# Patient Record
Sex: FEMALE | Race: WHITE | NOT HISPANIC OR LATINO | Employment: OTHER | URBAN - METROPOLITAN AREA
[De-identification: names, ages, dates, MRNs, and addresses within clinical notes are randomized per-mention and may not be internally consistent; named-entity substitution may affect disease eponyms.]

---

## 2017-06-15 PROBLEM — C82.93 NODULAR LYMPHOMA OF INTRA-ABDOMINAL LYMPH NODES: Status: ACTIVE | Noted: 2017-06-15

## 2017-08-28 ENCOUNTER — HOSPITAL ENCOUNTER (OUTPATIENT)
Facility: HOSPITAL | Age: 77
Discharge: HOME | End: 2017-08-29
Attending: EMERGENCY MEDICINE | Admitting: INTERNAL MEDICINE
Payer: COMMERCIAL

## 2017-08-28 DIAGNOSIS — R56.9 SEIZURE: ICD-10-CM

## 2017-08-28 DIAGNOSIS — R55 SYNCOPE, UNSPECIFIED SYNCOPE TYPE: Primary | ICD-10-CM

## 2017-08-28 DIAGNOSIS — R55 SYNCOPE: ICD-10-CM

## 2017-08-28 PROBLEM — M54.30 SCIATIC PAIN: Status: ACTIVE | Noted: 2017-08-28

## 2017-08-28 PROBLEM — E78.5 HYPERLIPIDEMIA: Status: ACTIVE | Noted: 2017-08-28

## 2017-08-28 PROBLEM — E87.6 HYPOKALEMIA: Status: ACTIVE | Noted: 2017-08-28

## 2017-08-28 PROBLEM — R40.4 TRANSIENT ALTERATION OF AWARENESS: Status: ACTIVE | Noted: 2017-08-28

## 2017-08-28 PROBLEM — Z85.528 H/O RENAL CELL CARCINOMA: Status: ACTIVE | Noted: 2017-08-28

## 2017-08-28 PROBLEM — I10 ESSENTIAL HYPERTENSION: Status: ACTIVE | Noted: 2017-08-28

## 2017-08-28 PROBLEM — N18.30 STAGE 3 CHRONIC KIDNEY DISEASE: Status: ACTIVE | Noted: 2017-08-28

## 2017-08-28 LAB
ALBUMIN SERPL BCP-MCNC: 3.2 G/DL
ALP SERPL-CCNC: 70 U/L
ALT SERPL W/O P-5'-P-CCNC: 14 U/L
ANION GAP SERPL CALC-SCNC: 11 MMOL/L
AST SERPL-CCNC: 14 U/L
BASOPHILS # BLD AUTO: 0.01 K/UL
BASOPHILS NFR BLD: 0.1 %
BILIRUB SERPL-MCNC: 0.2 MG/DL
BILIRUB UR QL STRIP: NEGATIVE
BUN SERPL-MCNC: 41 MG/DL
CALCIUM SERPL-MCNC: 8.8 MG/DL
CHLORIDE SERPL-SCNC: 111 MMOL/L
CHOLEST/HDLC SERPL: 3.8 {RATIO}
CK SERPL-CCNC: 23 U/L
CLARITY UR REFRACT.AUTO: ABNORMAL
CO2 SERPL-SCNC: 22 MMOL/L
COLOR UR AUTO: YELLOW
CREAT SERPL-MCNC: 1.5 MG/DL
CREAT SERPL-MCNC: 1.6 MG/DL (ref 0.5–1.4)
DIFFERENTIAL METHOD: ABNORMAL
EOSINOPHIL # BLD AUTO: 0 K/UL
EOSINOPHIL NFR BLD: 0.1 %
ERYTHROCYTE [DISTWIDTH] IN BLOOD BY AUTOMATED COUNT: 13.4 %
EST. GFR  (AFRICAN AMERICAN): 38.5 ML/MIN/1.73 M^2
EST. GFR  (NON AFRICAN AMERICAN): 33.4 ML/MIN/1.73 M^2
ESTIMATED AVG GLUCOSE: 111 MG/DL
GLUCOSE SERPL-MCNC: 126 MG/DL
GLUCOSE UR QL STRIP: NEGATIVE
HBA1C MFR BLD HPLC: 5.5 %
HCT VFR BLD AUTO: 45.8 %
HDL/CHOLESTEROL RATIO: 26.5 %
HDLC SERPL-MCNC: 196 MG/DL
HDLC SERPL-MCNC: 52 MG/DL
HGB BLD-MCNC: 15.4 G/DL
HGB UR QL STRIP: NEGATIVE
INR PPP: 0.9
KETONES UR QL STRIP: NEGATIVE
LDLC SERPL CALC-MCNC: 112.6 MG/DL
LEUKOCYTE ESTERASE UR QL STRIP: NEGATIVE
LYMPHOCYTES # BLD AUTO: 1.5 K/UL
LYMPHOCYTES NFR BLD: 13.8 %
MAGNESIUM SERPL-MCNC: 2.5 MG/DL
MCH RBC QN AUTO: 30.2 PG
MCHC RBC AUTO-ENTMCNC: 33.6 G/DL
MCV RBC AUTO: 90 FL
MONOCYTES # BLD AUTO: 0.8 K/UL
MONOCYTES NFR BLD: 7.2 %
NEUTROPHILS # BLD AUTO: 8.2 K/UL
NEUTROPHILS NFR BLD: 78.1 %
NITRITE UR QL STRIP: NEGATIVE
NONHDLC SERPL-MCNC: 144 MG/DL
PH UR STRIP: 6 [PH] (ref 5–8)
PHOSPHATE SERPL-MCNC: 4 MG/DL
PLATELET # BLD AUTO: 277 K/UL
PMV BLD AUTO: 9.6 FL
POC PTINR: 1.1 (ref 0.9–1.2)
POC PTWBT: 12.6 SEC (ref 9.7–14.3)
POCT GLUCOSE: 103 MG/DL (ref 70–110)
POTASSIUM SERPL-SCNC: 3.4 MMOL/L
PROT SERPL-MCNC: 6.1 G/DL
PROT UR QL STRIP: NEGATIVE
PROTHROMBIN TIME: 10.2 SEC
RBC # BLD AUTO: 5.1 M/UL
SAMPLE: ABNORMAL
SAMPLE: NORMAL
SODIUM SERPL-SCNC: 144 MMOL/L
SP GR UR STRIP: 1.01 (ref 1–1.03)
TRIGL SERPL-MCNC: 157 MG/DL
TROPONIN I SERPL DL<=0.01 NG/ML-MCNC: 0.01 NG/ML
TSH SERPL DL<=0.005 MIU/L-ACNC: 1.72 UIU/ML
TSH SERPL DL<=0.005 MIU/L-ACNC: 1.72 UIU/ML
URN SPEC COLLECT METH UR: ABNORMAL
UROBILINOGEN UR STRIP-ACNC: NEGATIVE EU/DL
WBC # BLD AUTO: 10.48 K/UL

## 2017-08-28 PROCEDURE — 80053 COMPREHEN METABOLIC PANEL: CPT

## 2017-08-28 PROCEDURE — 85025 COMPLETE CBC W/AUTO DIFF WBC: CPT

## 2017-08-28 PROCEDURE — 83036 HEMOGLOBIN GLYCOSYLATED A1C: CPT

## 2017-08-28 PROCEDURE — A4216 STERILE WATER/SALINE, 10 ML: HCPCS | Performed by: PHYSICIAN ASSISTANT

## 2017-08-28 PROCEDURE — 93005 ELECTROCARDIOGRAM TRACING: CPT

## 2017-08-28 PROCEDURE — 36592 COLLECT BLOOD FROM PICC: CPT

## 2017-08-28 PROCEDURE — 63600175 PHARM REV CODE 636 W HCPCS: Performed by: PHYSICIAN ASSISTANT

## 2017-08-28 PROCEDURE — 84443 ASSAY THYROID STIM HORMONE: CPT

## 2017-08-28 PROCEDURE — 25000003 PHARM REV CODE 250: Performed by: PHYSICIAN ASSISTANT

## 2017-08-28 PROCEDURE — 99285 EMERGENCY DEPT VISIT HI MDM: CPT | Mod: 25

## 2017-08-28 PROCEDURE — 99285 EMERGENCY DEPT VISIT HI MDM: CPT | Mod: ,,, | Performed by: PSYCHIATRY & NEUROLOGY

## 2017-08-28 PROCEDURE — 84100 ASSAY OF PHOSPHORUS: CPT

## 2017-08-28 PROCEDURE — 96372 THER/PROPH/DIAG INJ SC/IM: CPT

## 2017-08-28 PROCEDURE — 99219 PR INITIAL OBSERVATION CARE,LEVL II: CPT | Mod: ,,, | Performed by: PHYSICIAN ASSISTANT

## 2017-08-28 PROCEDURE — 93010 ELECTROCARDIOGRAM REPORT: CPT | Mod: ,,, | Performed by: INTERNAL MEDICINE

## 2017-08-28 PROCEDURE — 82550 ASSAY OF CK (CPK): CPT

## 2017-08-28 PROCEDURE — 84484 ASSAY OF TROPONIN QUANT: CPT

## 2017-08-28 PROCEDURE — G0378 HOSPITAL OBSERVATION PER HR: HCPCS

## 2017-08-28 PROCEDURE — 99284 EMERGENCY DEPT VISIT MOD MDM: CPT | Mod: ,,, | Performed by: EMERGENCY MEDICINE

## 2017-08-28 PROCEDURE — 81003 URINALYSIS AUTO W/O SCOPE: CPT

## 2017-08-28 PROCEDURE — 85610 PROTHROMBIN TIME: CPT

## 2017-08-28 PROCEDURE — 96360 HYDRATION IV INFUSION INIT: CPT

## 2017-08-28 PROCEDURE — 94761 N-INVAS EAR/PLS OXIMETRY MLT: CPT

## 2017-08-28 PROCEDURE — 83735 ASSAY OF MAGNESIUM: CPT

## 2017-08-28 PROCEDURE — 96361 HYDRATE IV INFUSION ADD-ON: CPT

## 2017-08-28 PROCEDURE — 82962 GLUCOSE BLOOD TEST: CPT

## 2017-08-28 PROCEDURE — 82565 ASSAY OF CREATININE: CPT

## 2017-08-28 PROCEDURE — 80061 LIPID PANEL: CPT

## 2017-08-28 PROCEDURE — 25000003 PHARM REV CODE 250: Performed by: EMERGENCY MEDICINE

## 2017-08-28 RX ORDER — BACLOFEN 10 MG/1
10 TABLET ORAL 3 TIMES DAILY
COMMUNITY
End: 2019-10-03 | Stop reason: CLARIF

## 2017-08-28 RX ORDER — ASPIRIN 81 MG/1
81 TABLET ORAL DAILY
Status: DISCONTINUED | OUTPATIENT
Start: 2017-08-28 | End: 2017-08-29 | Stop reason: HOSPADM

## 2017-08-28 RX ORDER — LOSARTAN POTASSIUM 50 MG/1
100 TABLET ORAL NIGHTLY
Status: DISCONTINUED | OUTPATIENT
Start: 2017-08-28 | End: 2017-08-29 | Stop reason: HOSPADM

## 2017-08-28 RX ORDER — BACLOFEN 10 MG/1
10 TABLET ORAL NIGHTLY
Status: DISCONTINUED | OUTPATIENT
Start: 2017-08-28 | End: 2017-08-29 | Stop reason: HOSPADM

## 2017-08-28 RX ORDER — ONDANSETRON 2 MG/ML
4 INJECTION INTRAMUSCULAR; INTRAVENOUS EVERY 8 HOURS PRN
Status: DISCONTINUED | OUTPATIENT
Start: 2017-08-28 | End: 2017-08-29 | Stop reason: HOSPADM

## 2017-08-28 RX ORDER — ROSUVASTATIN CALCIUM 5 MG/1
10 TABLET, COATED ORAL NIGHTLY
Status: DISCONTINUED | OUTPATIENT
Start: 2017-08-28 | End: 2017-08-29 | Stop reason: HOSPADM

## 2017-08-28 RX ORDER — POTASSIUM CHLORIDE 20 MEQ/1
40 TABLET, EXTENDED RELEASE ORAL ONCE
Status: COMPLETED | OUTPATIENT
Start: 2017-08-28 | End: 2017-08-28

## 2017-08-28 RX ORDER — SODIUM CHLORIDE 0.9 % (FLUSH) 0.9 %
3 SYRINGE (ML) INJECTION EVERY 8 HOURS
Status: DISCONTINUED | OUTPATIENT
Start: 2017-08-28 | End: 2017-08-29 | Stop reason: HOSPADM

## 2017-08-28 RX ORDER — AMLODIPINE BESYLATE 5 MG/1
5 TABLET ORAL NIGHTLY
Status: DISCONTINUED | OUTPATIENT
Start: 2017-08-28 | End: 2017-08-29 | Stop reason: HOSPADM

## 2017-08-28 RX ORDER — SODIUM CHLORIDE 9 MG/ML
INJECTION, SOLUTION INTRAVENOUS CONTINUOUS
Status: ACTIVE | OUTPATIENT
Start: 2017-08-28 | End: 2017-08-29

## 2017-08-28 RX ORDER — ONDANSETRON 8 MG/1
8 TABLET, ORALLY DISINTEGRATING ORAL EVERY 8 HOURS PRN
Status: DISCONTINUED | OUTPATIENT
Start: 2017-08-28 | End: 2017-08-29 | Stop reason: HOSPADM

## 2017-08-28 RX ORDER — IBUPROFEN 600 MG/1
600 TABLET ORAL EVERY 6 HOURS PRN
Status: DISCONTINUED | OUTPATIENT
Start: 2017-08-28 | End: 2017-08-29 | Stop reason: HOSPADM

## 2017-08-28 RX ORDER — HEPARIN SODIUM 5000 [USP'U]/ML
5000 INJECTION, SOLUTION INTRAVENOUS; SUBCUTANEOUS EVERY 8 HOURS
Status: DISCONTINUED | OUTPATIENT
Start: 2017-08-28 | End: 2017-08-29 | Stop reason: HOSPADM

## 2017-08-28 RX ADMIN — SODIUM CHLORIDE: 0.9 INJECTION, SOLUTION INTRAVENOUS at 03:08

## 2017-08-28 RX ADMIN — LOSARTAN POTASSIUM 100 MG: 50 TABLET, FILM COATED ORAL at 08:08

## 2017-08-28 RX ADMIN — POTASSIUM CHLORIDE 40 MEQ: 1500 TABLET, EXTENDED RELEASE ORAL at 03:08

## 2017-08-28 RX ADMIN — HEPARIN SODIUM 5000 UNITS: 5000 INJECTION, SOLUTION INTRAVENOUS; SUBCUTANEOUS at 02:08

## 2017-08-28 RX ADMIN — AMLODIPINE BESYLATE 5 MG: 5 TABLET ORAL at 08:08

## 2017-08-28 RX ADMIN — SODIUM CHLORIDE, PRESERVATIVE FREE 3 ML: 5 INJECTION INTRAVENOUS at 02:08

## 2017-08-28 RX ADMIN — BACLOFEN 10 MG: 10 TABLET ORAL at 08:08

## 2017-08-28 RX ADMIN — SODIUM CHLORIDE 1000 ML: 0.9 INJECTION, SOLUTION INTRAVENOUS at 09:08

## 2017-08-28 RX ADMIN — ROSUVASTATIN CALCIUM 10 MG: 5 TABLET ORAL at 08:08

## 2017-08-28 RX ADMIN — HEPARIN SODIUM 5000 UNITS: 5000 INJECTION, SOLUTION INTRAVENOUS; SUBCUTANEOUS at 08:08

## 2017-08-28 RX ADMIN — SODIUM CHLORIDE, PRESERVATIVE FREE 3 ML: 5 INJECTION INTRAVENOUS at 10:08

## 2017-08-28 RX ADMIN — ASPIRIN 81 MG: 81 TABLET, COATED ORAL at 02:08

## 2017-08-28 NOTE — ASSESSMENT & PLAN NOTE
Likely 2/2 hypoperfusion syndrome   - Stroke code called 2/2 weakness. CT head without acute process. Evaluated by stroke team; presentation most c/w hypoperfusion syndrome with syncope and post-syncopal convulsion; partial seizure with secondary generalization in differential but felt less-likely; not typical of a stroke syndrome.    - No focal deficits on exam. Pt reports chronic R>L side weakness 2/2 sciatica at baseline  - CXR with ?increased pulm markings in L base. Afebrile without leukocytosis. Pt denies cough and SOB. Repeat CXR if patient develops sx  - UA unremarkable. No s/s infectious etiology  - Denies history of HF. Obtaining records from outside cardiologist, Dr. Carlos Isidro at [facility]  - EKG shows sinus scar. Trop and BNP WNL.   - Low suspicion for seizure activity. Ordered EEG given associated urinary incontinence   - Reports multiple loose stools at home. Denies diarrhea and hematochezia. Ordered stool cx   - Continuous telemetry  - Neuro checks q4

## 2017-08-28 NOTE — ASSESSMENT & PLAN NOTE
- BP elevated on admit  - Continue home norvasc 5 mg qhs and losartan 100 mg qhs  - Will continue to monitor and titrate meds PRN

## 2017-08-28 NOTE — ED NOTES
Pt resting in bed, no change in patient status. Updated on plan of care.  at bedside. Side rails up x2. Call light within reach. Will continue to monitor.

## 2017-08-28 NOTE — NURSING
Received pt to floor from ED via stretcher accompanied by escort. aaox 4. Complains of a 5/10 pain rating to the right sciatic area. Respirations even and unlabored. No distress noted. Pt stable. Pt oriented to room and call bell placed within reach.

## 2017-08-28 NOTE — SUBJECTIVE & OBJECTIVE
Past Medical History:   Diagnosis Date    Arthritis     Hypertension     Lymphoma     Renal cancer        Past Surgical History:   Procedure Laterality Date    broken foot      COLONOSCOPY      NEPHRECTOMY         Review of patient's allergies indicates:   Allergen Reactions    Sulfur Swelling       No current facility-administered medications on file prior to encounter.      Current Outpatient Prescriptions on File Prior to Encounter   Medication Sig    amlodipine (NORVASC) 5 MG tablet Take 5 mg by mouth once daily.    ascorbic acid, vitamin C, (VITAMIN C) 1000 MG tablet Take 1,000 mg by mouth once daily.    aspirin (ECOTRIN) 81 MG EC tablet Take 81 mg by mouth once daily.    calcium carbonate (OS-JORGE LUIS) 600 mg (1,500 mg) Tab Take 600 mg by mouth once daily.    cholecalciferol, vitamin D3, (VITAMIN D3) 2,000 unit Cap Take 1 capsule by mouth once daily.    ESTRACE 0.01 % (0.1 mg/gram) vaginal cream APPLY 1GM VAGINALLY 3 TIMES A WEEK    glucosamine-chondroitin (OSTEO BI-FLEX) 250-200 mg Tab Take 2 tablets by mouth once daily at 6am.    krill-omega-3-dha-epa-lipids (KRILL OIL) 402-65-06-50 mg Cap Take 1 capsule by mouth once daily at 6am.    Lactobacillus acidophilus (PROBIOTIC) 10 billion cell Cap Take 1 capsule by mouth once daily at 6am.    losartan (COZAAR) 100 MG tablet Take 100 mg by mouth once daily.    MV,CA,IRON,MIN/FA/PHYTOSTEROL (CENTRUM CARDIO ORAL) Take 1 capsule by mouth once daily at 6am.    MYRBETRIQ 25 mg Tb24 ER tablet Take 25 mg by mouth once daily.    rosuvastatin (CRESTOR) 10 MG tablet Take 10 mg by mouth once daily.    [DISCONTINUED] biotin 5 mg Cap Take 1 tablet by mouth once daily at 6am.     Family History     Problem Relation (Age of Onset)    Coronary artery disease Mother, Father        Social History Main Topics    Smoking status: Never Smoker    Smokeless tobacco: Never Used    Alcohol use No    Drug use: No    Sexual activity: Not on file     Review of Systems    Constitutional: Positive for fatigue. Negative for chills, diaphoresis and fever.   HENT: Negative for congestion, hearing loss, rhinorrhea, sore throat and trouble swallowing.    Eyes: Negative for photophobia and visual disturbance.   Respiratory: Negative for cough, chest tightness, shortness of breath and wheezing.    Cardiovascular: Negative for chest pain, palpitations and leg swelling.   Gastrointestinal: Positive for diarrhea. Negative for abdominal distention, abdominal pain, blood in stool, nausea and vomiting.   Endocrine: Negative for cold intolerance and heat intolerance.   Genitourinary: Positive for frequency (chronic). Negative for difficulty urinating, dysuria, flank pain and hematuria.   Musculoskeletal: Positive for back pain (chronic). Negative for joint swelling and neck pain. Gait problem: 2/2 chronic back pain.   Skin: Negative for rash and wound.   Allergic/Immunologic: Negative for immunocompromised state.   Neurological: Positive for syncope, weakness and light-headedness. Negative for seizures and headaches.   Hematological: Negative for adenopathy. Does not bruise/bleed easily.   Psychiatric/Behavioral: Negative for agitation and confusion. The patient is not nervous/anxious.      Objective:     Vital Signs (Most Recent):  Temp: 97.1 °F (36.2 °C) (08/28/17 0758)  Pulse: 61 (08/28/17 1406)  Resp: 16 (08/28/17 1406)  BP: (!) 152/72 (08/28/17 1406)  SpO2: 97 % (08/28/17 1406) Vital Signs (24h Range):  Temp:  [97.1 °F (36.2 °C)] 97.1 °F (36.2 °C)  Pulse:  [51-67] 61  Resp:  [16-18] 16  SpO2:  [97 %-99 %] 97 %  BP: (138-159)/(68-84) 152/72        There is no height or weight on file to calculate BMI.    Physical Exam   Constitutional: She is oriented to person, place, and time. She appears well-developed. No distress.   HENT:   Head: Normocephalic and atraumatic.   Eyes: Conjunctivae are normal. Pupils are equal, round, and reactive to light. Right eye exhibits no discharge. Left eye  exhibits no discharge. No scleral icterus.   Neck: Normal range of motion. Neck supple. No JVD present. No tracheal deviation present.   Cardiovascular: Normal rate, regular rhythm, normal heart sounds and intact distal pulses.    No murmur heard.  Pulmonary/Chest: Effort normal and breath sounds normal. No respiratory distress. She has no wheezes. She exhibits no tenderness.   Abdominal: Soft. Bowel sounds are normal. She exhibits no distension. There is no tenderness.   Obese    Musculoskeletal: Normal range of motion. She exhibits no edema, tenderness or deformity.   Neurological: She is alert and oriented to person, place, and time. No cranial nerve deficit.   Skin: Skin is warm and dry. She is not diaphoretic. No erythema.   Psychiatric: She has a normal mood and affect. Her behavior is normal.   Nursing note and vitals reviewed.       Significant Labs: All pertinent labs within the past 24 hours have been reviewed.    Significant Imaging: I have reviewed all pertinent imaging results/findings within the past 24 hours.

## 2017-08-28 NOTE — ED NOTES
Pt assisted on and off bed pan per EDT. Tolerated well. Repositioned for comfort.  remains at bedside.

## 2017-08-28 NOTE — ASSESSMENT & PLAN NOTE
In setting of s/p R nephrectomy   - Cr 1.5 on admit. Baseline unknown  - Suspect dehydration component given GI losses at home  - Gentle IVF  - Monitor daily

## 2017-08-28 NOTE — ED TRIAGE NOTES
[place] per  EMS from airport after patient had seizures.  reports while sitting in wheelchair noticed bilateral hand shaking x 3-4 seconds, head went down, and then patient went unresponsive. EMS reports postictal upon arrival. No hx of seizures. Pt arrives AAox3, diaphoretic and lethargic. Pt was incontinent of urine during seizure event.

## 2017-08-28 NOTE — CONSULTS
[facility]  Vascular Neurology  Comprehensive Stroke Center  Consult Note    Inpatient consult to Vascular (Stroke) Neurology  Consult performed by: TANIA CRENSHAW  Consult ordered by: FABIANA POOLE III  Reason for consult: AMS        Assessment/Plan:     Patient is a 76 y.o. year old female with:    Transient alteration of awareness    Ms. Jenkins is a 77yo F with hypertension, CKD, R sciatic pain who presents to the ER after a 15 minute episode of unresponsiveness.     - CTH negative for acute process  - Symptoms currently improved to near baseline, though patient still reports nausea and fatigue. No focal neurologic deficits during or after episode  - Consider workup for syncope per ED/hospital medicine. Unlikely based on history and physical exam that this event was related to stroke/TIA. Also consider on differential and workup seizures/post-ictal state   - Ok to take ASA 81mg daily for secondary stroke prevention   - No further vascular/stroke workup needed              Essential hypertension    Stroke risk factor, recommend BP goal <140/80            Thrombolysis Candidate? No  1. Contraindications: symptoms resolved, non focal exam  2. Warnings: Rapid improvement     Interventional Revascularization Candidate?  No; No large vessel occlusion    Research Candidate? No    Subjective:     History of Present Illness:  Ms. Jenkins is a 77yo F with hypertension who was brought to the ED for an episode of transient alteration of awareness. She was at the airport this morning with her  about 6:30am in her normal state of health, and then about 6:45 she was sitting in a wheelchair in line and felt suddenly nauseous, lightheaded, generalized weakness and diaphoretic so she asked to lie down but she forgets what happened after that. She states the next thing she recalls is EMS doing a sternal rub. She denies any headache, vision changes, palpitations, SOB associated.  Her  who  "witnessed the event reports patient then closed her eyes, was not responding to questions, and had bilateral hand shaking. Currently, patient has chills and feels "very tired" but denies any focal weakness, numbness/paresthesias, vision changes, vertigo, or headache.  Reports last year she had a similar episode with same symptoms after a day of walking on a ghost tour. She did not seek medical attention at that time for the episode since it resolved and her symptoms improved after hydration.   No prior history of stroke or TIA. She states she usually takes 1/2 aspirin daily but has not been recently due to her recent sciatic nerve pain medications (NSAIDs, baclofen).          Past Medical History:   Diagnosis Date    Arthritis     Hypertension     Lymphoma     Renal cancer      Past Surgical History:   Procedure Laterality Date    broken foot      COLONOSCOPY      NEPHRECTOMY       Family History   Problem Relation Age of Onset    Coronary artery disease Mother     Coronary artery disease Father      Social History   Substance Use Topics    Smoking status: Never Smoker    Smokeless tobacco: Never Used    Alcohol use No     Review of patient's allergies indicates:   Allergen Reactions    Sulfur Swelling     Medications: I have reviewed the current medication administration record.      (Not in a hospital admission)    Review of Systems   Constitutional: Positive for chills, diaphoresis and fatigue.   HENT: Negative for trouble swallowing.    Eyes: Negative for photophobia and visual disturbance.   Respiratory: Negative for shortness of breath.    Cardiovascular: Negative for chest pain and palpitations.   Gastrointestinal: Positive for nausea. Negative for abdominal pain and vomiting.   Endocrine: Negative for polydipsia and polyphagia.   Genitourinary: Negative for dysuria.   Musculoskeletal: Positive for back pain. Negative for neck pain.   Skin: Negative for rash.   Allergic/Immunologic: Negative for " immunocompromised state.   Neurological: Positive for weakness (generalized weakness). Negative for speech difficulty, numbness and headaches.   Hematological: Does not bruise/bleed easily.   Psychiatric/Behavioral: Negative for agitation, behavioral problems and confusion.     Objective:     Vital Signs (Most Recent):  Temp: 97.1 °F (36.2 °C) (08/28/17 0758)  Pulse: (!) 58 (08/28/17 0900)  Resp: 16 (08/28/17 0900)  BP: (!) 152/84 (08/28/17 0900)  SpO2: 99 % (08/28/17 0900)    Vital Signs Range (Last 24H):  Temp:  [97.1 °F (36.2 °C)]   Pulse:  [51-58]   Resp:  [16-18]   BP: (140-152)/(68-84)   SpO2:  [97 %-99 %]     Physical Exam   Constitutional: She is oriented to person, place, and time. She appears well-developed and well-nourished. No distress.   HENT:   Head: Normocephalic and atraumatic.   Eyes: EOM are normal. Pupils are equal, round, and reactive to light.   Mild R eye ptosis    Neck: Normal range of motion. Neck supple.   Cardiovascular: Normal rate and regular rhythm.    Pulmonary/Chest: Effort normal.   Abdominal: Soft.   Musculoskeletal: Normal range of motion.   Neurological: She is oriented to person, place, and time. She has normal reflexes. She exhibits normal muscle tone. Coordination normal. GCS eye subscore is 3 - to speech. GCS verbal subscore is 5 - oriented. GCS motor subscore is 6 - obeys commands.   Slightly drowsy   Skin: Skin is dry. She is not diaphoretic.   Cool   Psychiatric: She has a normal mood and affect. Her behavior is normal. Thought content normal.       Neurological Exam:   LOC: follows requests and drowsy  Language: No aphasia  Speech: No dysarthria  Orientation: Person, Place, Time  Memory: Recent memory intact, Remote memory intact, Age correct, Month correct  Visual Fields (CN II): Full  EOM (CN III, IV, VI): Full/intact  Oculocephalics: normal  Pupils (CN III, IV, VI): PERRL  Facial Sensation (CN V): Symmetric  Facial Movement (CN VII): symmetric facial expression  Hearing  (CN VIII): intact bilaterally  Gag Reflex (CN IX, X): not done  Shoulder/Neck (CN XI): SCM-Left: Normal ; SCM-Right: Normal ; Shoulder Shrug: Normal/Symetric  Tongue (CN XII): to midline  Reflexes: flexor plantar responses bilaterally and 2+ throughout  Motor*: Arm Left:  Paretic:  4/5, Leg Left:   Normal (5/5), Arm Right:   Paretic:  4/5, Leg Right:   Normal (5/5)  Cerebellar*: Normal limb  Sensation: intact to light touch, temperature and vibration  Tone: Arm-Left: normal; Leg-Left: normal; Arm-Right: normal; Leg-Right: normal    NIH Stroke Scale:  Interval: baseline (upon arrival/admit)  Level of Consciousness: 1 - drowsy  LOC Questions: 0 - answers both correctly  LOC Commands: 0 - performs both correctly  Best Gaze: 0 - normal  Visual: 0 - no visual loss  Facial Palsy: 0 - normal  Motor Left Arm: 0 - no drift  Motor Right Arm: 0 - no drift  Motor Left Le - no drift  Motor Right Le - no drift  Limb Ataxia: 0 - absent  Sensory: 0 - normal  Best Language: 0 - no aphasia  Dysarthria: 0 - normal articulation  Extinction and Inattention: 0 - no neglect  NIH Stroke Scale Total: 1  Bellona Coma Scale:  Best Eye Response: 3 - to speech  Best Motor Response: 6 - obeys commands  Best Verbal Response: 5 - oriented  Carl Coma Scale Total: 14  Modified Radha Scale:   Timeline: Prior to symptoms onset  Modified Radha Score: 1 - no significant disability        Laboratory:  CMP:   Recent Labs  Lab 17  0841   CALCIUM 8.8   ALBUMIN 3.2*   PROT 6.1      K 3.4*   CO2 22*   *   BUN 41*   CREATININE 1.5*   ALKPHOS 70   ALT 14   AST 14   BILITOT 0.2     BMP:   Recent Labs  Lab 17  0841      K 3.4*   *   CO2 22*   BUN 41*   CREATININE 1.5*   CALCIUM 8.8     CBC:   Recent Labs  Lab 17  0841   WBC 10.48   RBC 5.10   HGB 15.4   HCT 45.8      MCV 90   MCH 30.2   MCHC 33.6     Lipid Panel:   Recent Labs  Lab 17  0841   CHOL 196   LDLCALC 112.6   HDL 52   TRIG 157*      Coagulation:   Recent Labs  Lab 08/28/17  0841   INR 0.9     Platelet Aggregation Study: No results for input(s): PLTAGG, PLTAGINTERP, PLTAGREGLACO, ADPPLTAGGREG in the last 168 hours.  Hgb A1C: No results for input(s): HGBA1C in the last 168 hours.  TSH:   Recent Labs  Lab 08/28/17  0841   TSH 1.715  1.715       Diagnostic Results:  Brain Imaging: CT Head. Date: 8/28/17  Acute Pathology: None  Location: N/A  Old Vascular Pathology: Microvascular disease  Location: Diffuse    Cerebrovascular Imaging: none     Cervical Vascular Imaging: none    Cardiac Evaluation:     EKG/Telemetry: Sinus rhythm, on telemetry HR around 60      Auryduran Cade PA-C  Comprehensive Stroke Center  Department of Vascular Neurology   [facility]

## 2017-08-28 NOTE — HPI
"76 year old female with a PMHx of HTN, HLD, RCC s/p R nephrectomy 2008, and sciatic nerve pain presenting to the ED with  at bedside after syncopal episode this morning at the airport. Pt reports ambulating with walker through airport but had "ordered a wheelchair" for assistance to get to terminal. Pt states she felt weak after ambulating with walker and sat down. Pt was sitting down waiting for wheelchair when "a wave of nausea hit me." Pt endorses associated lightheadedness and "felt like I needed to lay down." Per , pt's "head dropped and I think she was out of it."  reports LOC for "about 2 minutes" with associated hand shaking for 2-3 seconds.  states patient was verbally nonresponsive but able to follow commands. Patient also endorses urinary incontinence during episode. Pt denies associated headache, chest pain, SOB, emesis, and diaphoresis. Of note, pt reports "loose stools" x3 days, weakness, and fatigue at home. Pt reports she had a similar episode last November with similar sx of exhaustion and required her to lie down for a few minutes. Pt endorses weakness and fatigue s/p episode. 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 Pt sees specialist in [place] who recently rx her steroids, muscle relaxers, and Elvis for pain.      In the ED, pt was initially hypertensive with stable VS. Afebrile without leukocytosis. Labs remarkable for K 3.4 and Cr 1.5 (unknown baseline). Troponin, BNP, CPK, and TSH all WNL. UA unremarkable. CT head without acute process. CXR with ?increased pulm markings on L base. EKG shows sinus scar (HR 51). Evaluated by stroke team; presentation most c/w hypoperfusion syndrome with syncope and post-syncopal convulsion; partial seizure with secondary generalization in differential but felt less-likely; not typical of a stroke syndrome.      "

## 2017-08-28 NOTE — HPI
"Ms. Jenkins is a 77yo F with hypertension who was brought to the ED for an episode of transient alteration of awareness. [place] the airport this [place] with her  about 6:30am in her normal state of health, and then about 6:45 she was sitting in a wheelchair in line and felt suddenly nauseous, lightheaded, generalized weakness and diaphoretic so she asked to lie down but she forgets what happened after that. She states the next thing she recalls is EMS doing a sternal rub. She denies any headache, vision changes, palpitations, SOB associated.  Her  who witnessed the event reports patient then closed her eyes, was not responding to questions, and had bilateral hand shaking. Currently, patient has chills and feels "very tired" but denies any focal weakness, numbness/paresthesias, vision changes, vertigo, or headache.  Reports last year she had a similar episode with same symptoms after a day of walking on a ghost tour. She did not seek medical attention at that time for the episode since it resolved and her symptoms improved after hydration.   No prior history of stroke or TIA. She states she usually takes 1/2 aspirin daily but has not been recently due to her recent sciatic nerve pain medications (NSAIDs, baclofen).   "

## 2017-08-28 NOTE — ED NOTES
Patient identifiers have been checked and are correct.  Patient assisted to ED stretcher and placed in a hospital gown.     LOC: The patient is awake, alert, and aware of environment. The patient is oriented x 3 and speaking appropriately.   APPEARANCE: No acute distress noted. No seizure activity noted. Lethargic.   PSYCHOSOCIAL: Patient is calm and cooperative.   SKIN: The skin is warm and dry.   RESPIRATORY: Airway is open and patent. Bilateral chest rise and fall. Respirations are spontaneous, even and unlabored. Normal effort and rate noted. No accessory muscle use noted. Denies any SOB.   CARDIAC: Patient has a normal rate and rhythm on continuous cardiac monitor. Denies chest pain.   ABDOMEN: Soft and non tender to palpation. No distention noted. + nausea currently.   NEUROLOGIC: Eyes open spontaneously. Speech clear. Tolerating saliva secretions well. Able to follow commands, demonstrating ability to actively and appropriately communicate within context of current conversation. Symmetrical facial muscles. Moving all extremities. Movement is purposeful. .   MUSCULOSKELETAL: No obvious deformities noted.     Side rails up x2. Call light within reach.  at bedside. Will continue to monitor.

## 2017-08-28 NOTE — PLAN OF CARE
Problem: Patient Care Overview  Goal: Plan of Care Review  Outcome: Ongoing (interventions implemented as appropriate)  Pt on fall precautions. Yellow fall risk band and socks on pt. Instructed pt to call for assistance as needed and pt voiced understanding. Complains of sciatica pain and is being medicated as needed and as ordered. SB/SR on tele. Neuro checks Q4 hours.

## 2017-08-28 NOTE — ASSESSMENT & PLAN NOTE
"- Chronic back pain. Follows with specialist in [place]  - Pt reports worsening pain and recently received "shot"  - No point tenderness on exam  - Continue home baclofen 10 qhs  - Consulted PT/OT    "

## 2017-08-28 NOTE — ED NOTES
"Tele box placed on pt, war room notified. Pt updated on room assignment and plan of care, verbalize understanding. Remains AAOx3, no acute distress noted. Respirations remain even and unlabored. Pt denies any current complaints. States "i feel so much better." Side rails up x2. Call light within reach.  at bedside. Will continue to monitor.   "

## 2017-08-28 NOTE — ED PROVIDER NOTES
Encounter Date: 8/28/2017    SCRIBE #1 NOTE: I, Lauren Marvin, am scribing for, and in the presence of,  Dr. Mcgrath. I have scribed the entire note.       History     Chief Complaint   Patient presents with    Seizures     at airport this am. No hx of seizures     Time patient was seen by the provider: 8:20 AM      The patient is a 76 y.o. female with hx of: HTN, renal cancer, nephrectomy, and sciatic nerve pain that presents to the ED with a complaint of syncope that onset this AM while walking in the airport. She was having trouble walking, sat down, her hands shook for 2-3 seconds, her head dropped with associated LOC, then her hands shook again for another 2-3 seconds. She was verbally non responsive as reported by her  and had difficulty sticking her tongue out when asked. She notes having nausea.  Pt also complains of some diarrhea and urinary frequency lasting the last few days. She notes that her sx are dissimilar to previous UTI sx. She has extreme fatigue now and for the last few days. She is normally very active but her back pain inhibits normal activity. She has been taking a steroid, muscle relaxant, and Elvis for her sciatic pain, which has worsened recently and never been this severe. She also noted a dizziness episode a year ago which required her to lie down for a few minutes and noted a similar sx of exhaustion which was alleviated by water intake. She denies any CP, SOB, palpitations, HA, or abdominal pain. Her last meal was 14 hours ago.       The history is provided by the patient, the spouse and medical records.     Review of patient's allergies indicates:   Allergen Reactions    Sulfur Swelling     Past Medical History:   Diagnosis Date    Arthritis     Coronary artery disease     Hypertension     Lymphoma     Renal cancer     Seizures      Past Surgical History:   Procedure Laterality Date    broken foot      COLONOSCOPY      FRACTURE SURGERY      NEPHRECTOMY       Family  History   Problem Relation Age of Onset    Coronary artery disease Mother     Coronary artery disease Father      Social History   Substance Use Topics    Smoking status: Never Smoker    Smokeless tobacco: Never Used    Alcohol use No     Review of Systems   Constitutional: Positive for activity change and fatigue. Negative for chills and fever.   HENT: Negative for sore throat.    Respiratory: Negative for shortness of breath.    Cardiovascular: Negative for chest pain and palpitations.   Gastrointestinal: Positive for diarrhea and nausea. Negative for abdominal pain.   Genitourinary: Positive for frequency. Negative for dysuria.   Musculoskeletal: Positive for back pain (sciatica).   Skin: Negative for rash.   Neurological: Positive for tremors, syncope and speech difficulty. Negative for weakness and headaches.   Hematological: Does not bruise/bleed easily.       Physical Exam     Initial Vitals [08/28/17 0758]   BP Pulse Resp Temp SpO2   (!) 146/68 (!) 51 18 97.1 °F (36.2 °C) 97 %      MAP       94         Physical Exam    Nursing note and vitals reviewed.  Constitutional: She appears well-developed and well-nourished. She is not diaphoretic. No distress.   HENT:   Head: Normocephalic and atraumatic.   Eyes: EOM are normal. Pupils are equal, round, and reactive to light.   Neck: Normal range of motion. Neck supple.   Cardiovascular: Normal rate and regular rhythm. Exam reveals no gallop and no friction rub.    No murmur heard.  Pulmonary/Chest: Breath sounds normal. No respiratory distress. She has no wheezes. She has no rhonchi. She has no rales.   Abdominal: Soft. She exhibits no distension. There is no tenderness. There is no rebound and no guarding.   Musculoskeletal: Normal range of motion. She exhibits no edema or tenderness.   Neurological: She is alert and oriented to person, place, and time.   4+/5 RUE weakness, 5-/5 RLE weakness. Otherwise unremarkable neurological exam.    Skin: Skin is warm and  dry. No rash noted. No erythema.         ED Course   Procedures  Labs Reviewed   CBC W/ AUTO DIFFERENTIAL - Abnormal; Notable for the following:        Result Value    Gran # 8.2 (*)     Gran% 78.1 (*)     Lymph% 13.8 (*)     All other components within normal limits   COMPREHENSIVE METABOLIC PANEL - Abnormal; Notable for the following:     Potassium 3.4 (*)     Chloride 111 (*)     CO2 22 (*)     Glucose 126 (*)     BUN, Bld 41 (*)     Creatinine 1.5 (*)     Albumin 3.2 (*)     eGFR if  38.5 (*)     eGFR if non  33.4 (*)     All other components within normal limits   LIPID PANEL - Abnormal; Notable for the following:     Triglycerides 157 (*)     All other components within normal limits   URINALYSIS, REFLEX TO URINE CULTURE - Abnormal; Notable for the following:     Appearance, UA Hazy (*)     All other components within normal limits    Narrative:     Preferred Collection Type->Urine, Clean Catch   ISTAT CREATININE - Abnormal; Notable for the following:     POC Creatinine 1.6 (*)     All other components within normal limits   CULTURE, STOOL   PROTIME-INR   TSH   TROPONIN I   MAGNESIUM   PHOSPHORUS   CK   TSH   HEMOGLOBIN A1C   HEMOGLOBIN A1C    Narrative:     Add on Hemoglobin A1c per Dr. Mcgrath, order#[id number]   08/28/2017  13:44    POCT GLUCOSE   POCT GLUCOSE   ISTAT PROCEDURE     EKG Readings: (Independently Interpreted)   Sinus bradycardia, no ST elevation or depression       X-Rays:   Independently Interpreted Readings:   Chest X-Ray: Possible left lower PNA   Head CT: No acute process     Medical Decision Making:   History:   Old Medical Records: I decided to obtain old medical records.  Initial Assessment:   Pt initially billed as seizure activity but this does not sound like a seizure. She did have a syncopal episode and now on exam has right sided weakness which is new. Stroke code initiated immediately, pt is within tPA window. Will evaluate with CT scan and ultimately  admit.   Differential Diagnosis:   Syncope, seizure, stroke, electrolyte abnormality, UTI, PNA  Independently Interpreted Test(s):   I have ordered and independently interpreted X-rays - see prior notes.  I have ordered and independently interpreted EKG Reading(s) - see prior notes  Clinical Tests:   Lab Tests: Ordered and Reviewed  Radiological Study: Ordered and Reviewed  Medical Tests: Ordered and Reviewed  Other:   I have discussed this case with another health care provider.            Scribe Attestation:   Scribe #1: I performed the above scribed service and the documentation accurately describes the services I performed. I attest to the accuracy of the note.    Attending Attestation:           Physician Attestation for Scribe:  Physician Attestation Statement for Scribe #1: I, Dr. Mcgrath, reviewed documentation, as scribed by Lauren Marvin in my presence, and it is both accurate and complete.         Attending ED Notes:   Pt seen by neurology, they do not feel this was a stroke. Workup entirely normal, but she still feels very weak. Will observe on internal medicine for syncope.           ED Course     Clinical Impression:   The primary encounter diagnosis was Syncope, unspecified syncope type. Diagnoses of Seizure and Syncope were also pertinent to this visit.    Disposition:   Disposition: Admitted                        Eduardo Mcgrath III, MD  08/29/17 0626

## 2017-08-28 NOTE — H&P
"[facility] Hospital Medicine  History & Physical    Patient Name: Sarah Jenkins  MRN: [id number]  Admission Date: 8/28/2017  Attending Physician: Damion Bai MD   Primary Care Provider: Lavon Sorensen MD    [facility] Medicine Team: [facility] HOSP MED F Naheed Parker PA-C     Patient information was obtained from patient, spouse/SO and ER records.     Subjective:     Principal Problem:Syncope    Chief Complaint:   Chief Complaint   Patient presents with    Seizures     at airport this am. No hx of seizures        HPI: 76 year old female with a PMHx of HTN, HLD, RCC s/p R nephrectomy 2008, and sciatic nerve pain presenting to the ED with  at bedside after syncopal episode this morning at the airport. Pt reports ambulating with walker through airport but had "ordered a wheelchair" for assistance to get to terminal. Pt states she felt weak after ambulating with walker and sat down. Pt was sitting down waiting for wheelchair when "a wave of nausea hit me." Pt endorses associated lightheadedness and "felt like I needed to lay down." Per , pt's "head dropped and I think she was out of it."  reports LOC for "about 2 minutes" with associated hand shaking for 2-3 seconds.  states patient was verbally nonresponsive but able to follow commands. Patient also endorses urinary incontinence during episode. Pt denies associated headache, chest pain, SOB, emesis, and diaphoresis. Of note, pt reports "loose stools" x3 days, weakness, and fatigue at home. Pt reports she had a similar episode last November with similar sx of exhaustion and required her to lie down for a few minutes. Pt endorses weakness and fatigue s/p episode. 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 Pt sees specialist in [place] who recently rx her steroids, muscle relaxers, and Elvis for pain.      In the ED, pt was initially " hypertensive with stable VS. Afebrile without leukocytosis. Labs remarkable for K 3.4 and Cr 1.5 (unknown baseline). Troponin, BNP, CPK, and TSH all WNL. UA unremarkable. CT head without acute process. CXR with ?increased pulm markings on L base. EKG shows sinus scar (HR 51). Evaluated by stroke team; presentation most c/w hypoperfusion syndrome with syncope and post-syncopal convulsion; partial seizure with secondary generalization in differential but felt less-likely; not typical of a stroke syndrome.          Past Medical History:   Diagnosis Date    Arthritis     Hypertension     Lymphoma     Renal cancer        Past Surgical History:   Procedure Laterality Date    broken foot      COLONOSCOPY      NEPHRECTOMY         Review of patient's allergies indicates:   Allergen Reactions    Sulfur Swelling       No current facility-administered medications on file prior to encounter.      Current Outpatient Prescriptions on File Prior to Encounter   Medication Sig    amlodipine (NORVASC) 5 MG tablet Take 5 mg by mouth once daily.    ascorbic acid, vitamin C, (VITAMIN C) 1000 MG tablet Take 1,000 mg by mouth once daily.    aspirin (ECOTRIN) 81 MG EC tablet Take 81 mg by mouth once daily.    calcium carbonate (OS-JORGE LUIS) 600 mg (1,500 mg) Tab Take 600 mg by mouth once daily.    cholecalciferol, vitamin D3, (VITAMIN D3) 2,000 unit Cap Take 1 capsule by mouth once daily.    ESTRACE 0.01 % (0.1 mg/gram) vaginal cream APPLY 1GM VAGINALLY 3 TIMES A WEEK    glucosamine-chondroitin (OSTEO BI-FLEX) 250-200 mg Tab Take 2 tablets by mouth once daily at 6am.    krill-omega-3-dha-epa-lipids (KRILL OIL) 872-55-27-50 mg Cap Take 1 capsule by mouth once daily at 6am.    Lactobacillus acidophilus (PROBIOTIC) 10 billion cell Cap Take 1 capsule by mouth once daily at 6am.    losartan (COZAAR) 100 MG tablet Take 100 mg by mouth once daily.    MV,CA,IRON,MIN/FA/PHYTOSTEROL (CENTRUM CARDIO ORAL) Take 1 capsule by mouth once  daily at 6am.    MYRBETRIQ 25 mg Tb24 ER tablet Take 25 mg by mouth once daily.    rosuvastatin (CRESTOR) 10 MG tablet Take 10 mg by mouth once daily.    [DISCONTINUED] biotin 5 mg Cap Take 1 tablet by mouth once daily at 6am.     Family History     Problem Relation (Age of Onset)    Coronary artery disease Mother, Father        Social History Main Topics    Smoking status: Never Smoker    Smokeless tobacco: Never Used    Alcohol use No    Drug use: No    Sexual activity: Not on file     Review of Systems   Constitutional: Positive for fatigue. Negative for chills, diaphoresis and fever.   HENT: Negative for congestion, hearing loss, rhinorrhea, sore throat and trouble swallowing.    Eyes: Negative for photophobia and visual disturbance.   Respiratory: Negative for cough, chest tightness, shortness of breath and wheezing.    Cardiovascular: Negative for chest pain, palpitations and leg swelling.   Gastrointestinal: Positive for diarrhea. Negative for abdominal distention, abdominal pain, blood in stool, nausea and vomiting.   Endocrine: Negative for cold intolerance and heat intolerance.   Genitourinary: Positive for frequency (chronic). Negative for difficulty urinating, dysuria, flank pain and hematuria.   Musculoskeletal: Positive for back pain (chronic). Negative for joint swelling and neck pain. Gait problem: 2/2 chronic back pain.   Skin: Negative for rash and wound.   Allergic/Immunologic: Negative for immunocompromised state.   Neurological: Positive for syncope, weakness and light-headedness. Negative for seizures and headaches.   Hematological: Negative for adenopathy. Does not bruise/bleed easily.   Psychiatric/Behavioral: Negative for agitation and confusion. The patient is not nervous/anxious.      Objective:     Vital Signs (Most Recent):  Temp: 97.1 °F (36.2 °C) (08/28/17 0758)  Pulse: 61 (08/28/17 1406)  Resp: 16 (08/28/17 1406)  BP: (!) 152/72 (08/28/17 1406)  SpO2: 97 % (08/28/17 1406)  Vital Signs (24h Range):  Temp:  [97.1 °F (36.2 °C)] 97.1 °F (36.2 °C)  Pulse:  [51-67] 61  Resp:  [16-18] 16  SpO2:  [97 %-99 %] 97 %  BP: (138-159)/(68-84) 152/72        There is no height or weight on file to calculate BMI.    Physical Exam   Constitutional: She is oriented to person, place, and time. She appears well-developed. No distress.   HENT:   Head: Normocephalic and atraumatic.   Eyes: Conjunctivae are normal. Pupils are equal, round, and reactive to light. Right eye exhibits no discharge. Left eye exhibits no discharge. No scleral icterus.   Neck: Normal range of motion. Neck supple. No JVD present. No tracheal deviation present.   Cardiovascular: Normal rate, regular rhythm, normal heart sounds and intact distal pulses.    No murmur heard.  Pulmonary/Chest: Effort normal and breath sounds normal. No respiratory distress. She has no wheezes. She exhibits no tenderness.   Abdominal: Soft. Bowel sounds are normal. She exhibits no distension. There is no tenderness.   Obese    Musculoskeletal: Normal range of motion. She exhibits no edema, tenderness or deformity.   Neurological: She is alert and oriented to person, place, and time. No cranial nerve deficit.   Skin: Skin is warm and dry. She is not diaphoretic. No erythema.   Psychiatric: She has a normal mood and affect. Her behavior is normal.   Nursing note and vitals reviewed.       Significant Labs: All pertinent labs within the past 24 hours have been reviewed.    Significant Imaging: I have reviewed all pertinent imaging results/findings within the past 24 hours.    Assessment/Plan:     * Syncope    Likely 2/2 hypoperfusion syndrome   - Stroke code called 2/2 weakness. CT head without acute process. Evaluated by stroke team; presentation most c/w hypoperfusion syndrome with syncope and post-syncopal convulsion; partial seizure with secondary generalization in differential but felt less-likely; not typical of a stroke syndrome.    - No focal deficits  "on exam. Pt reports chronic R>L side weakness 2/2 sciatica at baseline  - CXR with ?increased pulm markings in L base. Afebrile without leukocytosis. Pt denies cough and SOB. Repeat CXR if patient develops sx  - UA unremarkable. No s/s infectious etiology  - Denies history of HF. Per records from Dr. Carlos Isidro at [facility], last 2d in 4/2014 with pEF. Ordered repeat 2d  - EKG shows sinus scar. Trop and BNP WNL.   - Low suspicion for seizure activity. Ordered EEG given associated urinary incontinence   - Reports multiple loose stools at home. Denies diarrhea and hematochezia. Ordered stool cx   - Continuous telemetry  - Neuro checks q4          Hyperlipidemia    - Continue daily ASA and crestor 10 mg qhs  - Secondary prevention          Essential hypertension    - BP elevated on admit  - Continue home norvasc 5 mg qhs and losartan 100 mg qhs  - Will continue to monitor and titrate meds PRN          Stage 3 chronic kidney disease    In setting of s/p R nephrectomy   - Cr 1.5 on admit. Baseline unknown  - Suspect dehydration component given GI losses at home  - Gentle IVF  - Monitor daily        H/O renal cell carcinoma    S/p R nephrectomy in 2008          Sciatic pain    - Chronic back pain. Follows with specialist in [place]  - Pt reports worsening pain and recently received "shot"  - No point tenderness on exam  - Continue home baclofen 10 qhs  - Consulted PT/OT          Hypokalemia    - K 3.4 on admit  - Replaced PO  - Trend daily            VTE Risk Mitigation         Ordered     heparin (porcine) injection 5,000 Units  Every 8 hours     Route:  Subcutaneous        08/28/17 1233     Place sequential compression device  Until discontinued      08/28/17 1233     Place FOX hose  Until discontinued      08/28/17 1233             Naheed Parker PA-C  Department of Hospital Medicine   [facility]  "

## 2017-08-28 NOTE — SUBJECTIVE & OBJECTIVE
Past Medical History:   Diagnosis Date    Arthritis     Hypertension     Lymphoma     Renal cancer      Past Surgical History:   Procedure Laterality Date    broken foot      COLONOSCOPY      NEPHRECTOMY       Family History   Problem Relation Age of Onset    Coronary artery disease Mother     Coronary artery disease Father      Social History   Substance Use Topics    Smoking status: Never Smoker    Smokeless tobacco: Never Used    Alcohol use No     Review of patient's allergies indicates:   Allergen Reactions    Sulfur Swelling     Medications: I have reviewed the current medication administration record.      (Not in a hospital admission)    Review of Systems   Constitutional: Positive for chills, diaphoresis and fatigue.   HENT: Negative for trouble swallowing.    Eyes: Negative for photophobia and visual disturbance.   Respiratory: Negative for shortness of breath.    Cardiovascular: Negative for chest pain and palpitations.   Gastrointestinal: Positive for nausea. Negative for abdominal pain and vomiting.   Endocrine: Negative for polydipsia and polyphagia.   Genitourinary: Negative for dysuria.   Musculoskeletal: Positive for back pain. Negative for neck pain.   Skin: Negative for rash.   Allergic/Immunologic: Negative for immunocompromised state.   Neurological: Positive for weakness (generalized weakness). Negative for speech difficulty, numbness and headaches.   Hematological: Does not bruise/bleed easily.   Psychiatric/Behavioral: Negative for agitation, behavioral problems and confusion.     Objective:     Vital Signs (Most Recent):  Temp: 97.1 °F (36.2 °C) (08/28/17 0758)  Pulse: (!) 58 (08/28/17 0900)  Resp: 16 (08/28/17 0900)  BP: (!) 152/84 (08/28/17 0900)  SpO2: 99 % (08/28/17 0900)    Vital Signs Range (Last 24H):  Temp:  [97.1 °F (36.2 °C)]   Pulse:  [51-58]   Resp:  [16-18]   BP: (140-152)/(68-84)   SpO2:  [97 %-99 %]     Physical Exam   Constitutional: She is oriented to  person, place, and time. She appears well-developed and well-nourished. No distress.   HENT:   Head: Normocephalic and atraumatic.   Eyes: EOM are normal. Pupils are equal, round, and reactive to light.   Mild R eye ptosis    Neck: Normal range of motion. Neck supple.   Cardiovascular: Normal rate and regular rhythm.    Pulmonary/Chest: Effort normal.   Abdominal: Soft.   Musculoskeletal: Normal range of motion.   Neurological: She is oriented to person, place, and time. She has normal reflexes. She exhibits normal muscle tone. Coordination normal. GCS eye subscore is 3 - to speech. GCS verbal subscore is 5 - oriented. GCS motor subscore is 6 - obeys commands.   Slightly drowsy   Skin: Skin is dry. She is not diaphoretic.   Cool   Psychiatric: She has a normal mood and affect. Her behavior is normal. Thought content normal.       Neurological Exam:   LOC: follows requests and drowsy  Language: No aphasia  Speech: No dysarthria  Orientation: Person, Place, Time  Memory: Recent memory intact, Remote memory intact, Age correct, Month correct  Visual Fields (CN II): Full  EOM (CN III, IV, VI): Full/intact  Oculocephalics: normal  Pupils (CN III, IV, VI): PERRL  Facial Sensation (CN V): Symmetric  Facial Movement (CN VII): symmetric facial expression  Hearing (CN VIII): intact bilaterally  Gag Reflex (CN IX, X): not done  Shoulder/Neck (CN XI): SCM-Left: Normal ; SCM-Right: Normal ; Shoulder Shrug: Normal/Symetric  Tongue (CN XII): to midline  Reflexes: flexor plantar responses bilaterally and 2+ throughout  Motor*: Arm Left:  Paretic:  4/5, Leg Left:   Normal (5/5), Arm Right:   Paretic:  4/5, Leg Right:   Normal (5/5)  Cerebellar*: Normal limb  Sensation: intact to light touch, temperature and vibration  Tone: Arm-Left: normal; Leg-Left: normal; Arm-Right: normal; Leg-Right: normal    NIH Stroke Scale:  Interval: baseline (upon arrival/admit)  Level of Consciousness: 1 - drowsy  LOC Questions: 0 - answers both  correctly  LOC Commands: 0 - performs both correctly  Best Gaze: 0 - normal  Visual: 0 - no visual loss  Facial Palsy: 0 - normal  Motor Left Arm: 0 - no drift  Motor Right Arm: 0 - no drift  Motor Left Le - no drift  Motor Right Le - no drift  Limb Ataxia: 0 - absent  Sensory: 0 - normal  Best Language: 0 - no aphasia  Dysarthria: 0 - normal articulation  Extinction and Inattention: 0 - no neglect  NIH Stroke Scale Total: 1  Farber Coma Scale:  Best Eye Response: 3 - to speech  Best Motor Response: 6 - obeys commands  Best Verbal Response: 5 - oriented  Farber Coma Scale Total: 14  Modified Radha Scale:   Timeline: Prior to symptoms onset  Modified McCreary Score: 1 - no significant disability        Laboratory:  CMP:   Recent Labs  Lab 17  0841   CALCIUM 8.8   ALBUMIN 3.2*   PROT 6.1      K 3.4*   CO2 22*   *   BUN 41*   CREATININE 1.5*   ALKPHOS 70   ALT 14   AST 14   BILITOT 0.2     BMP:   Recent Labs  Lab 17  0841      K 3.4*   *   CO2 22*   BUN 41*   CREATININE 1.5*   CALCIUM 8.8     CBC:   Recent Labs  Lab 17  0841   WBC 10.48   RBC 5.10   HGB 15.4   HCT 45.8      MCV 90   MCH 30.2   MCHC 33.6     Lipid Panel:   Recent Labs  Lab 17  0841   CHOL 196   LDLCALC 112.6   HDL 52   TRIG 157*     Coagulation:   Recent Labs  Lab 17  0841   INR 0.9     Platelet Aggregation Study: No results for input(s): PLTAGG, PLTAGINTERP, PLTAGREGLACO, ADPPLTAGGREG in the last 168 hours.  Hgb A1C: No results for input(s): HGBA1C in the last 168 hours.  TSH:   Recent Labs  Lab 17  0841   TSH 1.715  1.715       Diagnostic Results:  Brain Imaging: CT Head. Date: 17  Acute Pathology: None  Location: N/A  Old Vascular Pathology: Microvascular disease  Location: Diffuse    Cerebrovascular Imaging: none     Cervical Vascular Imaging: none    Cardiac Evaluation:     EKG/Telemetry: Sinus rhythm, on telemetry HR around 60

## 2017-08-28 NOTE — ED NOTES
Pt resting in bed, no acute distress noted. Respirations remain even and unlabored. No current complaints voiced. Updated on plan of care. Will continue to monitor.

## 2017-08-28 NOTE — ASSESSMENT & PLAN NOTE
Ms. Jenkins is a 77yo F with hypertension, CKD, R sciatic pain who presents to the ER after a 15 minute episode of unresponsiveness.     - CTH negative for acute process  - Symptoms currently improved to near baseline, though patient still reports nausea and fatigue. No focal neurologic deficits during or after episode  - Consider workup for syncope per ED/hospital medicine. Unlikely based on history and physical exam that this event was related to stroke/TIA. Also consider on differential and workup seizures/post-ictal state   - Ok to take ASA 81mg daily for secondary stroke prevention   - No further vascular/stroke workup needed

## 2017-08-29 VITALS
HEART RATE: 70 BPM | SYSTOLIC BLOOD PRESSURE: 169 MMHG | DIASTOLIC BLOOD PRESSURE: 90 MMHG | TEMPERATURE: 98 F | HEIGHT: 66 IN | RESPIRATION RATE: 18 BRPM | WEIGHT: 152 LBS | OXYGEN SATURATION: 97 % | BODY MASS INDEX: 24.43 KG/M2

## 2017-08-29 PROBLEM — R55 SYNCOPE: Status: RESOLVED | Noted: 2017-08-28 | Resolved: 2017-08-29

## 2017-08-29 PROBLEM — E87.6 HYPOKALEMIA: Status: RESOLVED | Noted: 2017-08-28 | Resolved: 2017-08-29

## 2017-08-29 LAB
ALBUMIN SERPL BCP-MCNC: 3 G/DL
ALP SERPL-CCNC: 69 U/L
ALT SERPL W/O P-5'-P-CCNC: 13 U/L
ANION GAP SERPL CALC-SCNC: 6 MMOL/L
AST SERPL-CCNC: 12 U/L
BASOPHILS # BLD AUTO: 0.01 K/UL
BASOPHILS NFR BLD: 0.1 %
BILIRUB SERPL-MCNC: 0.3 MG/DL
BUN SERPL-MCNC: 31 MG/DL
CALCIUM SERPL-MCNC: 8.2 MG/DL
CHLORIDE SERPL-SCNC: 113 MMOL/L
CO2 SERPL-SCNC: 25 MMOL/L
CREAT SERPL-MCNC: 1.2 MG/DL
DIASTOLIC DYSFUNCTION: NO
DIFFERENTIAL METHOD: NORMAL
EOSINOPHIL # BLD AUTO: 0.1 K/UL
EOSINOPHIL NFR BLD: 1.7 %
ERYTHROCYTE [DISTWIDTH] IN BLOOD BY AUTOMATED COUNT: 13.7 %
EST. GFR  (AFRICAN AMERICAN): 50.4 ML/MIN/1.73 M^2
EST. GFR  (NON AFRICAN AMERICAN): 43.7 ML/MIN/1.73 M^2
GLUCOSE SERPL-MCNC: 83 MG/DL
HCT VFR BLD AUTO: 44.7 %
HGB BLD-MCNC: 14.7 G/DL
LYMPHOCYTES # BLD AUTO: 1.6 K/UL
LYMPHOCYTES NFR BLD: 21.4 %
MAGNESIUM SERPL-MCNC: 2.2 MG/DL
MCH RBC QN AUTO: 30.1 PG
MCHC RBC AUTO-ENTMCNC: 32.9 G/DL
MCV RBC AUTO: 91 FL
MONOCYTES # BLD AUTO: 0.6 K/UL
MONOCYTES NFR BLD: 7.5 %
NEUTROPHILS # BLD AUTO: 5.1 K/UL
NEUTROPHILS NFR BLD: 68.5 %
PHOSPHATE SERPL-MCNC: 2.8 MG/DL
PLATELET # BLD AUTO: 272 K/UL
PMV BLD AUTO: 9.6 FL
POTASSIUM SERPL-SCNC: 4.5 MMOL/L
PROT SERPL-MCNC: 5.7 G/DL
RBC # BLD AUTO: 4.89 M/UL
RETIRED EF AND QEF - SEE NOTES: 55 (ref 55–65)
SODIUM SERPL-SCNC: 144 MMOL/L
WBC # BLD AUTO: 7.44 K/UL

## 2017-08-29 PROCEDURE — 63600175 PHARM REV CODE 636 W HCPCS: Performed by: PHYSICIAN ASSISTANT

## 2017-08-29 PROCEDURE — 80053 COMPREHEN METABOLIC PANEL: CPT

## 2017-08-29 PROCEDURE — A4216 STERILE WATER/SALINE, 10 ML: HCPCS | Performed by: PHYSICIAN ASSISTANT

## 2017-08-29 PROCEDURE — 95816 EEG AWAKE AND DROWSY: CPT

## 2017-08-29 PROCEDURE — 85025 COMPLETE CBC W/AUTO DIFF WBC: CPT

## 2017-08-29 PROCEDURE — 99217 PR OBSERVATION CARE DISCHARGE: CPT | Mod: ,,, | Performed by: PHYSICIAN ASSISTANT

## 2017-08-29 PROCEDURE — 95816 EEG AWAKE AND DROWSY: CPT | Mod: 26,,, | Performed by: PSYCHIATRY & NEUROLOGY

## 2017-08-29 PROCEDURE — 25000003 PHARM REV CODE 250: Performed by: PHYSICIAN ASSISTANT

## 2017-08-29 PROCEDURE — G0378 HOSPITAL OBSERVATION PER HR: HCPCS

## 2017-08-29 PROCEDURE — 83735 ASSAY OF MAGNESIUM: CPT

## 2017-08-29 PROCEDURE — 36415 COLL VENOUS BLD VENIPUNCTURE: CPT

## 2017-08-29 PROCEDURE — 84100 ASSAY OF PHOSPHORUS: CPT

## 2017-08-29 PROCEDURE — 93306 TTE W/DOPPLER COMPLETE: CPT

## 2017-08-29 PROCEDURE — 93306 TTE W/DOPPLER COMPLETE: CPT | Mod: 26,,, | Performed by: INTERNAL MEDICINE

## 2017-08-29 RX ADMIN — HEPARIN SODIUM 5000 UNITS: 5000 INJECTION, SOLUTION INTRAVENOUS; SUBCUTANEOUS at 05:08

## 2017-08-29 RX ADMIN — ASPIRIN 81 MG: 81 TABLET, COATED ORAL at 08:08

## 2017-08-29 RX ADMIN — SODIUM CHLORIDE, PRESERVATIVE FREE: 5 INJECTION INTRAVENOUS at 05:08

## 2017-08-29 NOTE — HOSPITAL COURSE
Pt admitted to observation for syncope likely secondary to hypoperfusion syndrome.  Stroke code called 2/2 weakness. CT head without acute process. Evaluated by stroke team; presentation most c/w hypoperfusion syndrome with syncope and post-syncopal convulsion; partial seizure with secondary generalization in differential but felt less-likely; not typical of a stroke syndrome.  No focal deficits on exam. Pt reports chronic R>L side weakness 2/2 sciatica at baseline.  EEG and 2Decho completed prior to discharge however pt requested to be discharge before testing results complete.  Risks explained to pt of leaving prior to tests resulting.  Pt declined PT/OT evaluation.  Provider will call with results of EEG and 2Decho.  Pt to follow up with PCP in 3-7 days.

## 2017-08-29 NOTE — PROGRESS NOTES
Pt discharged from unit.  Pt aaox3, vss, no s/s of distress noted.  Pt denies pain.  Discharge instructions given to pt and she verbalized understanding.  Home meds and f/u appts reviewed as well.  IV x 2 removed from lh and lac w/ both catheters intact, no redness or swelling noted to areas.  Pt left unit via w/c and was accompanied by spouse.

## 2017-08-29 NOTE — PLAN OF CARE
08/29/2017      Sarah Jenkins  [address] [place] [address]          Hospital Medicine Dept.  [facility] [address] [place] [address]  [phone] after hours  [phone] fax Sarah Jenkins has been hospitalized at the [facility] since 8/28/2017.  Please excuse the patient.    Please contact me if you have any questions.              Shaneka Montenegro PA-C    __________________________  Shaneka Montenegro PA-C  08/29/2017

## 2017-08-29 NOTE — DISCHARGE SUMMARY
"[facility] Hospital Medicine  Discharge Summary      Patient Name: Sarah Jenkins  MRN: [id number]  Admission Date: 8/28/2017  Hospital Length of Stay: 0 days  Discharge Date and Time:  08/29/2017 12:38 PM  Attending Physician: Damion Bai MD   Discharging Provider: Shaneka Montenegro PA-C  Primary Care Provider: Lavon Sorensen MD  [facility] Medicine Team: [facility] HOSP MED F Shaneka Montenegro PA-C    HPI:   76 year old female with a PMHx of HTN, HLD, RCC s/p R nephrectomy 2008, and sciatic nerve pain presenting to the ED with  at bedside after sync[place] at the airport. Pt reports ambulating with walker through airport but had "ordered a wheelchair" for assistance to get to terminal. Pt states she felt weak after ambulating with walker and sat down. Pt was sitting down waiting for wheelchair when "a wave of nausea hit me." Pt endorses associated lightheadedness and "felt like I needed to lay down." Per , pt's "head dropped and I think she was out of it."  reports LOC for "about 2 minutes" with associated hand shaking for 2-3 seconds.  states patient was verbally nonresponsive but able to follow commands. Patient also endorses urinary incontinence during episode. Pt denies associated headache, chest pain, SOB, emesis, and diaphoresis. Of note, pt reports "loose stools" x3 days, weakness, and fatigue at home. Pt reports she had a similar episode last November with similar sx of exhaustion and required her to lie down for a few minutes. Pt endorses weakness and fatigue s/p episode. 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 Pt sees specialist in [place] who recently rx her steroids, muscle relaxers, and Elvis for pain.      In the ED, pt was initially hypertensive with stable VS. Afebrile without leukocytosis. Labs remarkable for K 3.4 and Cr 1.5 (unknown baseline). " Troponin, BNP, CPK, and TSH all WNL. UA unremarkable. CT head without acute process. CXR with ?increased pulm markings on L base. EKG shows sinus scar (HR 51). Evaluated by stroke team; presentation most c/w hypoperfusion syndrome with syncope and post-syncopal convulsion; partial seizure with secondary generalization in differential but felt less-likely; not typical of a stroke syndrome.        * No surgery found *      Indwelling Lines/Drains at time of discharge:   Lines/Drains/Airways          No matching active lines, drains, or airways        Hospital Course:   Pt admitted to observation for syncope likely secondary to hypoperfusion syndrome.  Stroke code called 2/2 weakness. CT head without acute process. Evaluated by stroke team; presentation most c/w hypoperfusion syndrome with syncope and post-syncopal convulsion; partial seizure with secondary generalization in differential but felt less-likely; not typical of a stroke syndrome.  No focal deficits on exam. Pt reports chronic R>L side weakness 2/2 sciatica at baseline.  EEG and 2Decho completed prior to discharge however pt requested to be discharge before testing results complete.  Risks explained to pt of leaving prior to tests resulting.  Pt declined PT/OT evaluation.  Provider will call with results of EEG and 2Decho.  Pt to follow up with PCP in 3-7 days.     Consults:   Consults         Status Ordering Provider     Inpatient consult to Vascular (Stroke) Neurology  Once     Provider:  (Not yet assigned)    Completed FABIANA POOLE III          Significant Diagnostic Studies: Labs: All labs within the past 24 hours have been reviewed    Pending Diagnostic Studies:     None        Final Active Diagnoses:    Diagnosis Date Noted POA    Hyperlipidemia [E78.5] 08/28/2017 Yes    Essential hypertension [I10] 08/28/2017 Yes    Stage 3 chronic kidney disease [N18.3] 08/28/2017 Yes    H/O renal cell carcinoma [Z85.528] 08/28/2017 Not Applicable    Sciatic  pain [M54.30] 08/28/2017 Yes      Problems Resolved During this Admission:    Diagnosis Date Noted Date Resolved POA    PRINCIPAL PROBLEM:  Syncope [R55] 08/28/2017 08/29/2017 Yes    Hypokalemia [E87.6] 08/28/2017 08/29/2017 Yes      * Syncope-resolved as of 8/29/2017    Likely 2/2 hypoperfusion syndrome   - Stroke code called 2/2 weakness. CT head without acute process. Evaluated by stroke team; presentation most c/w hypoperfusion syndrome with syncope and post-syncopal convulsion; partial seizure with secondary generalization in differential but felt less-likely; not typical of a stroke syndrome.    - No focal deficits on exam. Pt reports chronic R>L side weakness 2/2 sciatica at baseline  - CXR with ?increased pulm markings in L base. Afebrile without leukocytosis. Pt denies cough and SOB. Repeat CXR if patient develops sx  - UA unremarkable. No s/s infectious etiology  - Denies history of HF. Obtaining records from outside cardiologist, Dr. Carlos Isidro at [facility]  - EKG shows sinus scar. Trop and BNP WNL.   - Low suspicion for seizure activity. Ordered EEG given associated urinary incontinence   - Reports multiple loose stools at home. Denies diarrhea and hematochezia. Ordered stool cx   - Continuous telemetry (no events)  - Neuro checks q4 (no change)  -EEG completed prior to discharge, will call with results as pt requesting to be discharged.          Hyperlipidemia    - Continue daily ASA and crestor 10 mg qhs  - Secondary prevention          Essential hypertension    - BP elevated on admit  - Continue home norvasc 5 mg qhs and losartan 100 mg qhs  - Will continue to monitor and titrate meds PRN          Stage 3 chronic kidney disease    In setting of s/p R nephrectomy   - Cr 1.5 on admit. Baseline unknown  - Suspect dehydration component given GI losses at home  - Gentle IVF  - Monitor daily        H/O renal cell carcinoma    S/p R nephrectomy in 2008          Sciatic pain    - Chronic back  "pain. Follows with specialist in [place]  - Pt reports worsening pain and recently received "shot"  - No point tenderness on exam  - Continue home baclofen 10 qhs  - Consulted PT/OT however pt declined evaluation              Discharged Condition: good    Disposition: Home or Self Care    Follow Up:  Follow-up Information     Lavon Sorensen MD On 9/12/2017.    Specialty:  Family Medicine  Why:  at 10:10 AM  Contact information:  [place] [address]  [phone]                 Patient Instructions:     Diet general     Activity as tolerated     Call MD for:  persistent nausea and vomiting or diarrhea     Call MD for:  persistent dizziness, light-headedness, or visual disturbances     Call MD for:  increased confusion or weakness       Medications:  Reconciled Home Medications:   Current Discharge Medication List      CONTINUE these medications which have NOT CHANGED    Details   amlodipine (NORVASC) 5 MG tablet Take 5 mg by mouth once daily.  Refills: 11      ascorbic acid, vitamin C, (VITAMIN C) 1000 MG tablet Take 1,000 mg by mouth once daily.      baclofen (LIORESAL) 10 MG tablet Take 10 mg by mouth 3 (three) times daily.      cholecalciferol, vitamin D3, (VITAMIN D3) 2,000 unit Cap Take 1 capsule by mouth once daily.      glucosamine-chondroitin (OSTEO BI-FLEX) 250-200 mg Tab Take 2 tablets by mouth once daily at 6am.      krill-omega-3-dha-epa-lipids (KRILL OIL) 328-20-46-50 mg Cap Take 1 capsule by mouth once daily at 6am.      Lactobacillus acidophilus (PROBIOTIC) 10 billion cell Cap Take 1 capsule by mouth once daily at 6am.      losartan (COZAAR) 100 MG tablet Take 100 mg by mouth once daily.  Refills: 11      MV,CA,IRON,MIN/FA/PHYTOSTEROL (CENTRUM CARDIO ORAL) Take 1 capsule by mouth once daily at 6am.      MYRBETRIQ 25 mg Tb24 ER tablet Take 25 mg by mouth once daily.  Refills: 11      rosuvastatin (CRESTOR) 10 MG tablet Take 10 mg by mouth once daily.  Refills: 3      aspirin (ECOTRIN) 81 MG EC " tablet Take 81 mg by mouth once daily.      calcium carbonate (OS-JORGE LUIS) 600 mg (1,500 mg) Tab Take 600 mg by mouth once daily.      ESTRACE 0.01 % (0.1 mg/gram) vaginal cream APPLY 1GM VAGINALLY 3 TIMES A WEEK  Refills: 3           Time spent on the discharge of patient: >30 minutes    HOS POC IP DISCHARGE SUMMARY    Shaneka Montenegro PA-C  Department of Hospital Medicine  [facility]

## 2017-08-29 NOTE — PLAN OF CARE
Problem: Patient Care Overview  Goal: Plan of Care Review  Outcome: Ongoing (interventions implemented as appropriate)  Pt aaox3, vss, no s/s of distress noted.  Safety precautions maintained, non skid socks and fall risk band on.  Seizure precautions maintained, suction at bedside, side rails padded.  Pt denies pain at this time.  No events noted on telemetry.  Call light within reach.  Spouse at bedside.

## 2017-08-29 NOTE — ASSESSMENT & PLAN NOTE
Likely 2/2 hypoperfusion syndrome   - Stroke code called 2/2 weakness. CT head without acute process. Evaluated by stroke team; presentation most c/w hypoperfusion syndrome with syncope and post-syncopal convulsion; partial seizure with secondary generalization in differential but felt less-likely; not typical of a stroke syndrome.    - No focal deficits on exam. Pt reports chronic R>L side weakness 2/2 sciatica at baseline  - CXR with ?increased pulm markings in L base. Afebrile without leukocytosis. Pt denies cough and SOB. Repeat CXR if patient develops sx  - UA unremarkable. No s/s infectious etiology  - Denies history of HF. Obtaining records from outside cardiologist, Dr. Carlos [facility]idro at [facility]  - EKG shows sinus scar. Trop and BNP WNL.   - Low suspicion for seizure activity. Ordered EEG given associated urinary incontinence   - Reports multiple loose stools at home. Denies diarrhea and hematochezia. Ordered stool cx   - Continuous telemetry (no events)  - Neuro checks q4 (no change)  -EEG completed prior to discharge, will call with results as pt requesting to be discharged.

## 2017-08-29 NOTE — PT/OT/SLP PROGRESS
Physical Therapy      Sarah Jenkins  MRN: [id number]    Patient not seen today secondary to Patient unwilling to participate. Pt and spouse are refusing PT services at this time.  No eval or history performed.  No follow-up as pt is to be discharged from the hospital today.    Vida Ramon, PT

## 2017-08-29 NOTE — PLAN OF CARE
Problem: Patient Care Overview  Goal: Plan of Care Review  Outcome: Ongoing (interventions implemented as appropriate)  Fall precaution maintained thru out shift; pt denies any pain or discomfort;  No sign of distress noted; heart monitored; seizure precaution maintained;

## 2017-08-29 NOTE — PLAN OF CARE
08/29/17 1200   Discharge Assessment   Assessment Type Discharge Planning Assessment   Confirmed/corrected address and phone number on facesheet? Yes   Assessment information obtained from? Caregiver  (spouse, Kai Jenkins ([phone]))   Expected Length of Stay (days) 1   Communicated expected length of stay with patient/caregiver yes   Prior to hospitilization cognitive status: Alert/Oriented   Prior to hospitalization functional status: Assistive Equipment   Current cognitive status: Alert/Oriented   Current Functional Status: Needs Assistance   Lives With spouse   Able to Return to Prior Arrangements yes   Is patient able to care for self after discharge? Yes   Patient's perception of discharge disposition home or selfcare   Readmission Within The Last 30 Days no previous admission in last 30 days   Patient currently being followed by outpatient case management? No   Patient currently receives any other outside agency services? No   Equipment Currently Used at Home walker, rolling;grab bar;shower chair   Do you have any problems affording any of your prescribed medications? No   Is the patient taking medications as prescribed? yes   Does the patient have transportation home? Yes   Transportation Available family or friend will provide   Does the patient receive services at the [facility]? No   Discharge Plan A Home with family   Discharge Plan B Home Health   Patient/Family In Agreement With Plan yes     Patient having EEG done when CM rounded. All discharge planning assessment information was obtained from the patient's spouse, Kai Jenkins ([phone]), outside the patient's room. Patient was admitted following a syncopal episode at the airport. Patient & spouse were leaving [place] to go on vacation to [place]. Patient & spouse have canceled the trip. Spouse requested a letter stating patient's hospitalization to send to [employer] to receive reimbursement. CM informed PEDRO Montenegro of  above request. Patient lives with Gene & has equipment to assist with ambulation. Plan to discharge patient home with support or home with home health when medically stable. Gene stated that he would provide transportation at time of discharge. Hospital follow up appointment scheduled for the patient with Dr. Lavon Sorensen (PCP) on 9/12/17 at 1010. Will continue to follow.

## 2017-08-29 NOTE — ASSESSMENT & PLAN NOTE
"- Chronic back pain. Follows with specialist in [place]  - Pt reports worsening pain and recently received "shot"  - No point tenderness on exam  - Continue home baclofen 10 qhs  - Consulted PT/OT however pt declined evaluation    "

## 2017-08-29 NOTE — PROCEDURES
ROUTINE ELECTROENCEPHALOGRAM REPORT      Sarah Jenkins  [id number]  1940    DATE OF SERVICE: 8/29/17    REQUESTING PROVIDER: Naheed Parker PA-C    REASON FOR CONSULT: 75yo F admitted with an episode of syncope    PRIOR EEG: none    MEDICATIONS:   No current facility-administered medications for this encounter.      Current Outpatient Prescriptions   Medication Sig    amlodipine (NORVASC) 5 MG tablet Take 5 mg by mouth once daily.    ascorbic acid, vitamin C, (VITAMIN C) 1000 MG tablet Take 1,000 mg by mouth once daily.    baclofen (LIORESAL) 10 MG tablet Take 10 mg by mouth 3 (three) times daily.    cholecalciferol, vitamin D3, (VITAMIN D3) 2,000 unit Cap Take 1 capsule by mouth once daily.    glucosamine-chondroitin (OSTEO BI-FLEX) 250-200 mg Tab Take 2 tablets by mouth once daily at 6am.    krill-omega-3-dha-epa-lipids (KRILL OIL) 567-66-78-50 mg Cap Take 1 capsule by mouth once daily at 6am.    Lactobacillus acidophilus (PROBIOTIC) 10 billion cell Cap Take 1 capsule by mouth once daily at 6am.    losartan (COZAAR) 100 MG tablet Take 100 mg by mouth once daily.    MV,CA,IRON,MIN/FA/PHYTOSTEROL (CENTRUM CARDIO ORAL) Take 1 capsule by mouth once daily at 6am.    MYRBETRIQ 25 mg Tb24 ER tablet Take 25 mg by mouth once daily.    rosuvastatin (CRESTOR) 10 MG tablet Take 10 mg by mouth once daily.    aspirin (ECOTRIN) 81 MG EC tablet Take 81 mg by mouth once daily.    calcium carbonate (OS-JORGE LUIS) 600 mg (1,500 mg) Tab Take 600 mg by mouth once daily.    ESTRACE 0.01 % (0.1 mg/gram) vaginal cream APPLY 1GM VAGINALLY 3 TIMES A WEEK     METHODOLOGY   Electroencephalographic (EEG) recording is with electrodes placed according to the International 10-20 placement system.  Thirty two (32) channels of digital signal (sampling rate of 512/sec) including T1 and T2 was simultaneously recorded from the scalp and may include  EKG, EMG, and/or eye monitors.  Recording band pass was 0.1 to 512 hz.  Digital video  recording of the patient is simultaneously recorded with the EEG.  The patient is instructed report clinical symptoms which may occur during the recording session.  EEG and video recording is stored and archived in digital format. Activation procedures which include photic stimulation, hyperventilation and instructing patients to perform simple task are done in selected patients.    The EEG is displayed on a monitor screen and can be reviewed using different montages.  Computer assisted analysis is employed to detect spike and electrographic seizure activity.   The entire record is submitted for computer analysis.  The entire recording is visually reviewed and the times identified by computer analysis as being spikes or seizures are reviewed again.  Compresses spectral analysis (CSA) is also performed on the activity recorded from each individual channel.  This is displayed as a power display of frequencies from 0 to 30 Hz over time.   The CSA is reviewed looking for asymmetries in power between homologous areas of the scalp and then compared with the original EEG recording.     [employer] software was also utilized in the review of this study.  This software suite analyzes the EEG recording in multiple domains.  Coherence and rhythmicity is computed to identify EEG sections which may contain organized seizures.  Each channel undergoes analysis to detect presence of spike and sharp waves which have special and morphological characteristic of epileptic activity.  The routine EEG recording is converted from spacial into frequency domain.  This is then displayed comparing homologous areas to identify areas of significant asymmetry.  Algorithm to identify non-cortically generated artifact is used to separate eye movement, EMG and other artifact from the EEG    EEG FINGINGS  Background activity:   The background rhythm was characterized by low amplitude (<10uV) theta-alpha (7-9 Hz) activity with a 11Hz posterior dominant  alpha rhythm at 30-70 microvolts.   Symmetry and continuity: The background was continuous and symmetric    Sleep:  Not seen.    Activation procedures:   Not done    Abnormal activity:   No epileptiform discharges, periodic discharges, lateralized rhythmic delta activity or electrographic seizures were seen.    IMPRESSION:   This is a normal awake EEG.    A normal EEG does not rule out seizures/epilepsy.  CLINICAL CORRELATION IS RECOMMENDED.    Majo Jacobo MD, ANTONIO(), [facility]  Neurology-Epilepsy.

## 2017-08-30 NOTE — PLAN OF CARE
08/30/17 0748   Final Note   Assessment Type Final Discharge Note   Discharge Disposition Home   What phone number can be called within the next 1-3 days to see how you are doing after discharge? [phone]   Hospital Follow Up  Appt(s) scheduled? Yes   Discharge plans and expectations educations in teach back method with documentation complete? No   Right Care Referral Info   Post Acute Recommendation No Care     Patient discharged home with no needs 8/29/17. Discharge summary faxed to Dr. Lavon Sorensen (PCP) f [phone].

## 2017-08-31 ENCOUNTER — TELEPHONE (OUTPATIENT)
Dept: HEPATOLOGY | Facility: HOSPITAL | Age: 77
End: 2017-08-31

## 2019-10-16 PROBLEM — N84.0 ENDOMETRIAL POLYP: Chronic | Status: RESOLVED | Noted: 2019-10-16 | Resolved: 2019-10-16

## 2019-10-16 PROBLEM — N84.0 ENDOMETRIAL POLYP: Chronic | Status: ACTIVE | Noted: 2019-10-16

## 2019-10-16 PROBLEM — R93.89 ABNORMAL PELVIC ULTRASOUND: Status: RESOLVED | Noted: 2019-10-16 | Resolved: 2019-10-16

## 2019-10-16 PROBLEM — R93.89 ABNORMAL PELVIC ULTRASOUND: Status: ACTIVE | Noted: 2019-10-16

## 2020-08-17 PROBLEM — R22.41 HIP MASS, RIGHT: Status: ACTIVE | Noted: 2020-08-17

## 2020-09-04 PROBLEM — R22.2 BUTTOCKS NODULE: Status: ACTIVE | Noted: 2020-09-04

## 2025-04-24 ENCOUNTER — PATIENT OUTREACH (OUTPATIENT)
Dept: ADMINISTRATIVE | Facility: OTHER | Age: 85
End: 2025-04-24

## 2025-04-24 VITALS — DIASTOLIC BLOOD PRESSURE: 84 MMHG | HEART RATE: 84 BPM | OXYGEN SATURATION: 98 % | SYSTOLIC BLOOD PRESSURE: 152 MMHG
